# Patient Record
Sex: FEMALE | Race: WHITE | NOT HISPANIC OR LATINO | URBAN - METROPOLITAN AREA
[De-identification: names, ages, dates, MRNs, and addresses within clinical notes are randomized per-mention and may not be internally consistent; named-entity substitution may affect disease eponyms.]

---

## 2017-08-22 ENCOUNTER — ALLSCRIPTS OFFICE VISIT (OUTPATIENT)
Dept: OTHER | Facility: OTHER | Age: 27
End: 2017-08-22

## 2017-08-22 DIAGNOSIS — Z13.0 ENCOUNTER FOR SCREENING FOR DISEASES OF THE BLOOD AND BLOOD-FORMING ORGANS AND CERTAIN DISORDERS INVOLVING THE IMMUNE MECHANISM: ICD-10-CM

## 2017-08-22 DIAGNOSIS — Z13.220 ENCOUNTER FOR SCREENING FOR LIPOID DISORDERS: ICD-10-CM

## 2017-08-22 DIAGNOSIS — E55.9 VITAMIN D DEFICIENCY: ICD-10-CM

## 2017-08-22 DIAGNOSIS — Z13.1 ENCOUNTER FOR SCREENING FOR DIABETES MELLITUS: ICD-10-CM

## 2017-08-22 DIAGNOSIS — F41.9 ANXIETY DISORDER: ICD-10-CM

## 2018-01-10 NOTE — PROGRESS NOTES
Assessment    1  Encounter for preventive health examination (V70 0) (Z00 00)    Plan  Anxiety    · Escitalopram Oxalate 10 MG Oral Tablet (Lexapro); TAKE 1 TABLET DAILY   · (1) TSH WITH FT4 REFLEX; Status:Active; Requested for:22Aug2017;   Depression screening    · *VB-Depression Screening; Status:Complete;   Done: 22Aug2017 02:25PM  Screening for deficiency anemia    · (1) CBC/PLT/DIFF; Status:Active; Requested for:22Aug2017;   Screening for diabetes mellitus (DM)    · (1) COMPREHENSIVE METABOLIC PANEL; Status:Active; Requested for:22Aug2017;   Screening for hyperlipidemia    · (1) LIPID PANEL FASTING W DIRECT LDL REFLEX; Status:Active; Requested  for:22Aug2017;   Vitamin D deficiency    · (1) VITAMIN D 25-HYDROXY; Status:Active; Requested for:22Aug2017;     Discussion/Summary  healthy adult female Currently, she eats an adequate diet and has an adequate exercise regimen  cervical cancer screening is current      Chief Complaint  NEWP to be established  Pt states that she is doing well, requesting refill on Lexapro  RTN labs ordered  Depression screening negative  History of Present Illness  , Adult Female: The patient is being seen for a health maintenance evaluation  The last health maintenance visit was 1 year(s) ago  Social History: She is unmarried  Work status: working full time  The patient has never smoked cigarettes  She reports rare alcohol use  She has never used illicit drugs  General Health: The patient's health since the last visit is described as good  She has regular dental visits  She denies vision problems  She denies hearing loss  Immunizations status: up to date  Lifestyle:  She consumes a diverse and healthy diet  She does not have any weight concerns  She exercises regularly  She does not use tobacco  She denies alcohol use  She denies drug use  Reproductive health: the patient is premenopausal   she reports normal menses  she uses no contraception   pregnancy history: G 0  Screening: cancer screening reviewed and updated  metabolic screening reviewed and updated  risk screening reviewed and updated  Review of Systems    Constitutional: No fever, no chills, feels well, no tiredness, no recent weight gain or weight loss  Eyes: No complaints of eye pain, no red eyes, no eyesight problems, no discharge, no dry eyes, no itching of eyes  ENT: no complaints of earache, no loss of hearing, no nose bleeds, no nasal discharge, no sore throat, no hoarseness  Cardiovascular: No complaints of slow heart rate, no fast heart rate, no chest pain, no palpitations, no leg claudication, no lower extremity edema  Respiratory: No complaints of shortness of breath, no wheezing, no cough, no SOB on exertion, no orthopnea, no PND  Gastrointestinal: No complaints of abdominal pain, no constipation, no nausea or vomiting, no diarrhea, no bloody stools  Genitourinary: No complaints of dysuria, no incontinence, no pelvic pain, no dysmenorrhea, no vaginal discharge or bleeding  Musculoskeletal: No complaints of arthralgias, no myalgias, no joint swelling or stiffness, no limb pain or swelling  Integumentary: No complaints of skin rash or lesions, no itching, no skin wounds, no breast pain or lump  Psychiatric: Not suicidal, no sleep disturbance, no anxiety or depression, no change in personality, no emotional problems  Endocrine: No complaints of proptosis, no hot flashes, no muscle weakness, no deepening of the voice, no feelings of weakness  Hematologic/Lymphatic: No complaints of swollen glands, no swollen glands in the neck, does not bleed easily, does not bruise easily  ROS reviewed  Active Problems    1  Anxiety (300 00) (F41 9)   2  Depression screening (V79 0) (Z13 89)   3  Screening for deficiency anemia (V78 1) (Z13 0)   4  Screening for diabetes mellitus (DM) (V77 1) (Z13 1)   5  Screening for hyperlipidemia (V77 91) (Z13 220)   6   Vitamin D deficiency (268 9) (E55 9)    Past Medical History    · Denied: History of substance abuse    Surgical History    · History of Ear Pressure Equalization Tube, Insertion, Bilaterally    Family History  Mother    · Denied: Family history of substance abuse   · Family history of thyroid disease (V18 19) (Z80 46)  Father    · Denied: Family history of substance abuse  Maternal Grandmother    · Family history of malignant neoplasm of breast (V16 3) (Z80 3)  Maternal Grandfather    · Family history of diabetes mellitus (V18 0) (Z83 3)  Paternal Grandfather    · Family history of Schizophrenic disorder  Family History    · Denied: Family history of substance abuse   · Family history of Schizophrenic disorder    Social History    · Never a smoker   · Social alcohol use (Z78 9)    Current Meds   1  Melatonin CAPS; take 1 tablet at bedtime as needed; Therapy: (Recorded:[id number]) to Recorded    Allergies    1  doxycycline    Vitals   Recorded: [id number] 02:31PM   Temperature 97 6 F   Heart Rate 76   Respiration 16   Systolic 113   Diastolic 66   Height 5 ft 8 in   Weight 172 lb 6 oz   BMI Calculated 26 21   BSA Calculated 1 92   O2 Saturation 98     Physical Exam    Constitutional   General appearance: No acute distress, well appearing and well nourished  Ears, Nose, Mouth, and Throat   External inspection of ears and nose: Normal     Otoscopic examination: Tympanic membranes translucent with normal light reflex  Canals patent without erythema  Hearing: Normal     Nasal mucosa, septum, and turbinates: Normal without edema or erythema  Lips, teeth, and gums: Normal, good dentition  Oropharynx: Normal with no erythema, edema, exudate or lesions  Neck   Neck: Supple, symmetric, trachea midline, no masses  Thyroid: Normal, no thyromegaly  Pulmonary   Respiratory effort: No increased work of breathing or signs of respiratory distress  Auscultation of lungs: Clear to auscultation      Cardiovascular   Auscultation of heart: Normal rate and rhythm, normal S1 and S2, no murmurs  Carotid pulses: 2+ bilaterally  Examination of extremities for edema and/or varicosities: Normal     Skin   Skin and subcutaneous tissue: Normal without rashes or lesions  Palpation of skin and subcutaneous tissue: Normal turgor  Psychiatric   Judgment and insight: Normal     Orientation to person, place, and time: Normal     Recent and remote memory: Intact  Mood and affect: Normal        Results/Data  PHQ-9 Adult Depression Screening [id number] 02:25PM Rosa Talavera     Test Name Result Flag Reference   PHQ-9 Adult Depression Score 4     Over the last two weeks, how often have you been bothered by any of the following problems? Little interest or pleasure in doing things: Several days - 1  Feeling down, depressed, or hopeless: Several days - 1  Trouble falling or staying asleep, or sleeping too much: Several days - 1  Feeling tired or having little energy: Several days - 1  Poor appetite or over eating: Not at all - 0  Feeling bad about yourself - or that you are a failure or have let yourself or your family down: Not at all - 0  Trouble concentrating on things, such as reading the newspaper or watching television: Not at all - 0  Moving or speaking so slowly that other people could have noticed  Or the opposite -  being so fidgety or restless that you have been moving around a lot more than usual: Not at all - 0  Thoughts that you would be better off dead, or of hurting yourself in some way: Not at all - 0   PHQ-9 Adult Depression Screening Negative     PHQ-9 Difficulty Level Somewhat difficult     PHQ-9 Severity Minimal Depression       *VB-Depression Screening [id number] 02:25PM Marine Bowling     Test Name Result Flag Reference   Depression Scale Result      Depression Screen - Negative For Symptoms       Signatures   Electronically signed by : MEHREEN Edwards;  Aug 23 2017  3:42PM EST                       (Author)    Electronically signed by : MISSY Pandya ; Aug 23 2017  3:54PM EST                       (Author)

## 2018-01-13 VITALS
DIASTOLIC BLOOD PRESSURE: 66 MMHG | RESPIRATION RATE: 16 BRPM | OXYGEN SATURATION: 98 % | WEIGHT: 172.38 LBS | TEMPERATURE: 97.6 F | HEIGHT: 68 IN | HEART RATE: 76 BPM | BODY MASS INDEX: 26.13 KG/M2 | SYSTOLIC BLOOD PRESSURE: 120 MMHG

## 2018-09-17 RX ORDER — ESCITALOPRAM OXALATE 10 MG/1
TABLET ORAL
Qty: 90 TABLET | Refills: 3 | OUTPATIENT
Start: 2018-09-17